# Patient Record
Sex: MALE | Race: WHITE | NOT HISPANIC OR LATINO | Employment: STUDENT | URBAN - METROPOLITAN AREA
[De-identification: names, ages, dates, MRNs, and addresses within clinical notes are randomized per-mention and may not be internally consistent; named-entity substitution may affect disease eponyms.]

---
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
## 2021-07-01 ENCOUNTER — TRANSFERRED RECORDS (OUTPATIENT)
Dept: HEALTH INFORMATION MANAGEMENT | Facility: CLINIC | Age: 21
End: 2021-07-01

## 2023-10-12 ENCOUNTER — DOCUMENTATION ONLY (OUTPATIENT)
Dept: FAMILY MEDICINE | Facility: CLINIC | Age: 23
End: 2023-10-12
Payer: COMMERCIAL

## 2023-10-12 DIAGNOSIS — Z11.1 SCREENING EXAMINATION FOR PULMONARY TUBERCULOSIS: Primary | ICD-10-CM

## 2023-10-12 NOTE — PROGRESS NOTES
Lucita Ellison has an upcoming lab appointment.        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      Please review and place future orders as appropriate.  If no Lab order will be placed, please advise patient.            Thanks,    Zahra Acosta

## 2023-10-13 NOTE — PROGRESS NOTES
Dr. 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

## 2023-10-17 DIAGNOSIS — Z11.1 SCREENING EXAMINATION FOR PULMONARY TUBERCULOSIS: Primary | ICD-10-CM

## 2023-10-17 PROCEDURE — 86580 TB INTRADERMAL TEST: CPT | Performed by: FAMILY MEDICINE

## 2023-10-18 ENCOUNTER — LAB (OUTPATIENT)
Dept: LAB | Facility: CLINIC | Age: 23
End: 2023-10-18
Attending: FAMILY MEDICINE
Payer: COMMERCIAL

## 2023-10-18 DIAGNOSIS — Z11.1 SCREENING EXAMINATION FOR PULMONARY TUBERCULOSIS: ICD-10-CM

## 2023-10-18 DIAGNOSIS — Z11.59 NEED FOR HEPATITIS C SCREENING TEST: ICD-10-CM

## 2023-10-18 DIAGNOSIS — Z11.4 SCREENING FOR HIV (HUMAN IMMUNODEFICIENCY VIRUS): Primary | ICD-10-CM

## 2023-10-18 LAB
HCV AB SERPL QL IA: NONREACTIVE
HIV 1+2 AB+HIV1 P24 AG SERPL QL IA: NONREACTIVE

## 2023-10-18 PROCEDURE — 86803 HEPATITIS C AB TEST: CPT

## 2023-10-18 PROCEDURE — 36415 COLL VENOUS BLD VENIPUNCTURE: CPT

## 2023-10-18 PROCEDURE — 87389 HIV-1 AG W/HIV-1&-2 AB AG IA: CPT

## 2023-10-18 PROCEDURE — 86481 TB AG RESPONSE T-CELL SUSP: CPT

## 2023-10-20 LAB
GAMMA INTERFERON BACKGROUND BLD IA-ACNC: 0.1 IU/ML
M TB IFN-G BLD-IMP: NEGATIVE
M TB IFN-G CD4+ BCKGRND COR BLD-ACNC: 9.9 IU/ML
MITOGEN IGNF BCKGRD COR BLD-ACNC: 0 IU/ML
MITOGEN IGNF BCKGRD COR BLD-ACNC: 0.01 IU/ML
QUANTIFERON MITOGEN: 10 IU/ML
QUANTIFERON NIL TUBE: 0.1 IU/ML
QUANTIFERON TB1 TUBE: 0.1 IU/ML
QUANTIFERON TB2 TUBE: 0.11

## 2023-11-10 NOTE — RESULT ENCOUNTER NOTE
Thank you very much for getting labs done!     Everything looks normal, which is good news.      Your screening test was negative for Hepatitis C, which is great news! You have NOT been infected with this liver disease.  You do not need any further testing for Hepatitis C.     Your hiv test was negative for infection, you do NOT have this disease.     Great news, your blood test was negative for tuberculosis, you do NOT have TB.    Sincerely,  Dr. Mechelle Burdick MD  11/10/2023

## 2023-11-25 ENCOUNTER — HEALTH MAINTENANCE LETTER (OUTPATIENT)
Age: 23
End: 2023-11-25

## 2025-01-04 ENCOUNTER — HEALTH MAINTENANCE LETTER (OUTPATIENT)
Age: 25
End: 2025-01-04

## 2025-02-24 ENCOUNTER — OFFICE VISIT (OUTPATIENT)
Dept: URGENT CARE | Facility: URGENT CARE | Age: 25
End: 2025-02-24
Payer: COMMERCIAL

## 2025-02-24 VITALS
HEART RATE: 86 BPM | SYSTOLIC BLOOD PRESSURE: 116 MMHG | TEMPERATURE: 99.2 F | DIASTOLIC BLOOD PRESSURE: 71 MMHG | BODY MASS INDEX: 28.31 KG/M2 | WEIGHT: 203 LBS | OXYGEN SATURATION: 99 %

## 2025-02-24 DIAGNOSIS — L30.9 DERMATITIS: ICD-10-CM

## 2025-02-24 DIAGNOSIS — L30.9 ECZEMA, UNSPECIFIED TYPE: Primary | ICD-10-CM

## 2025-02-24 PROCEDURE — 99203 OFFICE O/P NEW LOW 30 MIN: CPT | Performed by: NURSE PRACTITIONER

## 2025-02-24 RX ORDER — TRIAMCINOLONE ACETONIDE 1 MG/G
CREAM TOPICAL 2 TIMES DAILY
Qty: 80 G | Refills: 0 | Status: SHIPPED | OUTPATIENT
Start: 2025-02-24 | End: 2025-03-10

## 2025-02-25 NOTE — PATIENT INSTRUCTIONS
Eczema dermatitis flare  No pus or drainage to culture here today  Less likely differential includes secondary bacterial folliculitis, impetigo, tinea  Triggers include harsh chemicals frequent use of gloves exposure to cold air as well as dry heat  Apply steroid cream only to affected areas twice daily. Avoid face and groin.  Eucerin cream to whole body daily, especially right after bathing.  Avoid lotions with a scent as these can be irritating.  Can consider Dr. Brunner's Ananda soap unscented  Short cool baths infrequently (every other day).  Pat dry after bath rather than rubbing skin with towel to avoid irritation and to leave some moisture on skin- lotion liberally immediately.  Reevaluation with primary care provider or derm if new or worsening symptoms - can consider an e-visit with pictures or in person to obtain cultures

## 2025-02-25 NOTE — PROGRESS NOTES
Chief Complaint   Patient presents with    Rash     On arms, legs and lower back, no fever, no pain, itching, needs to know if he has a skin infection for work- problem x 2 weeks, using hydrocortisone ointment and antifungal cream      SUBJECTIVE:  Lucita Ellisno is a 25 year old male who presents to the clinic today with a rash to his hands arms legs low back over the past 3 weeks.  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  There is strict hand hygiene requirements with use of medical soap as well as sterile gloves.  He has had his hands breakout from this in the past as well as during cold weather months.  However, now he describes new pruritic flat pinpoint lesions traveling up his forearms along his legs and his low back.  Has been applying  hydrocortisone.  Also applied antifungal cream to one of the lesions as a preventative.  Declines 3 in a row tunneling pustules stuck on honey colored crust.  No other new exposures travel illness hot tub use.  No close contacts with this rash.    Past Medical History:   Diagnosis Date    Molluscum contagiosum 10/13/2011    Papular rash 2012    Routine infant or child health check 10/13/2011     Current Outpatient Medications   Medication Sig Dispense Refill    triamcinolone (KENALOG) 0.1 % external cream Apply topically 2 times daily for 14 days. 80 g 0     No current facility-administered medications for this visit.     Social History     Tobacco Use    Smoking status: Never    Smokeless tobacco: Never    Tobacco comments:     non smoking house    Substance Use Topics    Alcohol use: No     Allergies   Allergen Reactions    Amoxicillin Rash       Review of Systems  ROS: 10 point ROS neg other than the symptoms noted above in the HPI.    EXAM:   /71 (BP Location: Right arm, Patient Position: Sitting, Cuff Size: Adult Large)   Pulse 86   Temp 99.2  F (37.3  C) (Tympanic)   Wt 92.1 kg (203 lb)   SpO2 99%   BMI  28.31 kg/m      Physical Exam  Vitals reviewed.   Constitutional:       Appearance: Normal appearance.   HENT:      Head: Normocephalic and atraumatic.      Nose: Nose normal.      Mouth/Throat:      Mouth: Mucous membranes are moist.      Pharynx: Oropharynx is clear.   Eyes:      Extraocular Movements: Extraocular movements intact.      Conjunctiva/sclera: Conjunctivae normal.      Pupils: Pupils are equal, round, and reactive to light.   Cardiovascular:      Rate and Rhythm: Normal rate.      Pulses: Normal pulses.   Pulmonary:      Effort: Pulmonary effort is normal.   Musculoskeletal:         General: Normal range of motion.      Cervical back: Normal range of motion and neck supple.   Skin:     General: Skin is warm and dry.      Findings: Rash present.      Comments: Erythematous dry pinpoint maculopapular rash along dorsum of hands wrist forearms lower extremities and lower back, excoriations with scabbing.   Neurological:      General: No focal deficit present.      Mental Status: He is alert and oriented to person, place, and time.   Psychiatric:         Mood and Affect: Mood normal.         Behavior: Behavior normal.       ASSESSMENT:    ICD-10-CM    1. Eczema, unspecified type  L30.9 triamcinolone (KENALOG) 0.1 % external cream      2. Dermatitis  L30.9         PLAN:    Eczema dermatitis flare  No pus or drainage to culture here today  Less likely differential includes secondary bacterial folliculitis, impetigo, tinea  Triggers include harsh chemicals frequent use of gloves exposure to cold air as well as dry heat  Apply steroid cream only to affected areas twice daily. Avoid face and groin.  Eucerin cream to whole body daily, especially right after bathing.  Avoid lotions with a scent as these can be irritating.  Can consider Dr. Brunner's Ananda soap unscented  Short cool baths infrequently (every other day).  Pat dry after bath rather than rubbing skin with towel to avoid irritation and to leave  some moisture on skin- lotion liberally immediately.  Reevaluation with primary care provider or derm if new or worsening symptoms - can consider an e-visit with pictures or in person to obtain cultures    Follow up with primary care provider with any problems, questions or concerns or if symptoms worsen or fail to improve. Patient agreed to plan and verbalized understanding.    Chelsey Henriquez, NORAP-BC  [facility]

## 2025-03-20 ENCOUNTER — MYC MEDICAL ADVICE (OUTPATIENT)
Dept: FAMILY MEDICINE | Facility: CLINIC | Age: 25
End: 2025-03-20
Payer: COMMERCIAL

## 2025-03-20 DIAGNOSIS — L30.9 ECZEMA, UNSPECIFIED TYPE: Primary | ICD-10-CM

## 2025-03-21 NOTE — TELEPHONE ENCOUNTER
Dr. Burdick --    Please review and advise: referral -- derm for rash on hands and arms.   Do you want to see patient for this.     Writer responded via [employer].    CINTHYA FelixN RN  [facility]